# Patient Record
Sex: MALE | URBAN - METROPOLITAN AREA
[De-identification: names, ages, dates, MRNs, and addresses within clinical notes are randomized per-mention and may not be internally consistent; named-entity substitution may affect disease eponyms.]

---
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
## 2024-07-31 NOTE — PROCEDURE: MEDICATION COUNSELING
Post Virtual Visit Testing - [employer] scheduling       The provider who conducted your recent virtual visit has recommended additional testing for you.The testing is only valid for 3 days from your appointment. You can schedule your appointment to get tested in one of two ways:     Online at [email or web address] - Select the scheduling alert on the home page, or select Visits from the top navigation, then select Schedule an Appointment to get started.     With the [employer] mobile abraham - Tap the My Chart button on the bottom of the home screen, then tap Appointments and Schedule an Appointment.     Thank you-     [facility]     Humira Counseling:  I discussed with the patient the risks of adalimumab including but not limited to myelosuppression, immunosuppression, autoimmune hepatitis, demyelinating diseases, lymphoma, and serious infections.  The patient understands that monitoring is required including a PPD at baseline and must alert us or the primary physician if symptoms of infection or other concerning signs are noted.